# Patient Record
Sex: FEMALE | Race: OTHER | Employment: PART TIME | URBAN - METROPOLITAN AREA
[De-identification: names, ages, dates, MRNs, and addresses within clinical notes are randomized per-mention and may not be internally consistent; named-entity substitution may affect disease eponyms.]

---

## 2024-05-16 NOTE — PATIENT INSTRUCTIONS

## 2024-05-16 NOTE — PROGRESS NOTES
CARDIOLOGY OFFICE NOTE    Joe Putnam MD, [facility]    [address], [place] [address]  Phone [phone]; Fax [phone]  Mobile [phone]   Voice Mail [phone]    Primary care: Wale Walsh MD       ATTENTION:   This medical record was transcribed using an electronic medical records/speech recognition system.  Although proofread, it may and can contain electronic, spelling and other errors.  Corrections may be executed at a later time.  Please feel free to contact us for any clarifications as needed.             Yesi Gomez is a 48 y.o. female with  referred for chest discomfort           HISTORY OF PRESENTING ILLNESS    Ms./. Yesi Gomez  48 y.o. is very pleasant lady who has a history of T2DM, hypertension, dyslipidemia and obesity.  She describes chest discomfort that will occur mostly at rest.  She feels a sensation on her left chest when she is lying down.  Sometimes will get a tingling down her left arm and duration can be up to 5 minutes at a time.  Her last A1c was 7.2 and LDL was elevated 141.  She is working on weight loss and weight is gone from 190-182.  She says she does walk 1 hour a day.         ACTIVE PROBLEM LIST     There are no problems to display for this patient.          PAST MEDICAL HISTORY     History reviewed. No pertinent past medical history.        PAST SURGICAL HISTORY     History reviewed. No pertinent surgical history.       ALLERGIES     No Known Allergies       FAMILY HISTORY     History reviewed. No pertinent family history. negative for cardiac disease       SOCIAL HISTORY     Social History     Socioeconomic History    Marital status:      Spouse name: None    Number of children: None    Years of education: None    Highest education level: None   Tobacco Use    Smoking status: Unknown         MEDICATIONS     Current Outpatient Medications   Medication Sig    atorvastatin (LIPITOR) 40 MG tablet Take 1

## 2024-05-17 ENCOUNTER — OFFICE VISIT (OUTPATIENT)
Age: 49
End: 2024-05-17
Payer: COMMERCIAL

## 2024-05-17 VITALS
HEART RATE: 91 BPM | DIASTOLIC BLOOD PRESSURE: 90 MMHG | OXYGEN SATURATION: 97 % | WEIGHT: 182.5 LBS | SYSTOLIC BLOOD PRESSURE: 142 MMHG | HEIGHT: 60 IN | BODY MASS INDEX: 35.83 KG/M2

## 2024-05-17 DIAGNOSIS — I10 ESSENTIAL HYPERTENSION: Primary | ICD-10-CM

## 2024-05-17 DIAGNOSIS — R07.9 CHEST PAIN, UNSPECIFIED TYPE: Primary | ICD-10-CM

## 2024-05-17 DIAGNOSIS — E78.00 HYPERCHOLESTEREMIA: Primary | ICD-10-CM

## 2024-05-17 DIAGNOSIS — R07.9 CHEST PAIN: ICD-10-CM

## 2024-05-17 PROCEDURE — 99204 OFFICE O/P NEW MOD 45 MIN: CPT | Performed by: SPECIALIST

## 2024-05-17 PROCEDURE — 3080F DIAST BP >= 90 MM HG: CPT | Performed by: SPECIALIST

## 2024-05-17 PROCEDURE — 3077F SYST BP >= 140 MM HG: CPT | Performed by: SPECIALIST

## 2024-05-17 PROCEDURE — 93000 ELECTROCARDIOGRAM COMPLETE: CPT | Performed by: SPECIALIST

## 2024-05-17 RX ORDER — ROSUVASTATIN CALCIUM 20 MG/1
20 TABLET, COATED ORAL DAILY
COMMUNITY
End: 2024-05-17 | Stop reason: SDUPTHER

## 2024-05-17 RX ORDER — LOSARTAN POTASSIUM AND HYDROCHLOROTHIAZIDE 25; 100 MG/1; MG/1
1 TABLET ORAL DAILY
COMMUNITY
Start: 2024-04-22

## 2024-05-17 RX ORDER — ATORVASTATIN CALCIUM 40 MG/1
40 TABLET, FILM COATED ORAL DAILY
COMMUNITY
Start: 2024-05-02 | End: 2024-05-17 | Stop reason: ALTCHOICE

## 2024-05-17 RX ORDER — ROSUVASTATIN CALCIUM 20 MG/1
20 TABLET, COATED ORAL DAILY
Qty: 30 TABLET | Refills: 5 | Status: SHIPPED | OUTPATIENT
Start: 2024-05-17

## 2024-05-24 ENCOUNTER — TELEPHONE (OUTPATIENT)
Age: 49
End: 2024-05-24

## 2024-05-24 NOTE — TELEPHONE ENCOUNTER
Joe Putnam MD Thompson, Evelyn, LPN  Caller: Unspecified (Today, 10:26 AM)  I would like 1st-1st to stop it and see if it improves or not to start the Lipitor for 2 weeks.  If her symptoms do not improve then go back to Crestor

## 2024-05-24 NOTE — TELEPHONE ENCOUNTER
Pt c/o Crestor causing diarrhea/stomach pain since starting it 5/17/24. Want her to go back to Lipitor? Stop and see if symptoms improve?

## 2024-05-24 NOTE — TELEPHONE ENCOUNTER
Patient called in regards to some symptoms that she has been experiencing on her recently changed medication. Patient is experiencing severe diarrhea, upset stomach and loose bowels up to 4 times a day. Patient would like to know if she can take this medication at night instead of in the morning, so that this doesn't continue to affect her during her work in the daytime. Patient would like a call back to discuss.      Patient Phone # [phone]

## 2024-07-24 LAB
ALBUMIN SERPL-MCNC: 4.2 G/DL (ref 3.9–4.9)
ALP SERPL-CCNC: 91 IU/L (ref 44–121)
ALT SERPL-CCNC: 14 IU/L (ref 0–32)
AST SERPL-CCNC: 16 IU/L (ref 0–40)
BILIRUB DIRECT SERPL-MCNC: <0.1 MG/DL (ref 0–0.4)
BILIRUB SERPL-MCNC: 0.3 MG/DL (ref 0–1.2)
CHOLEST SERPL-MCNC: 164 MG/DL (ref 100–199)
HDLC SERPL-MCNC: 55 MG/DL
LDLC SERPL CALC-MCNC: 86 MG/DL (ref 0–99)
PROT SERPL-MCNC: 6.8 G/DL (ref 6–8.5)
TRIGL SERPL-MCNC: 128 MG/DL (ref 0–149)
VLDLC SERPL CALC-MCNC: 23 MG/DL (ref 5–40)

## 2024-07-24 NOTE — RESULT ENCOUNTER NOTE
Dear Ms. Jason,  Good News!  Your test results are stable.   Please continue the current treatment plan.  We can discuss more at your scheduled follow up if you have questions.  Best Regards,  GISSELLE Mirza NP

## 2024-08-21 RX ORDER — ROSUVASTATIN CALCIUM 20 MG/1
20 TABLET, COATED ORAL DAILY
Qty: 90 TABLET | Refills: 0 | Status: SHIPPED | OUTPATIENT
Start: 2024-08-21

## 2024-08-23 ENCOUNTER — OFFICE VISIT (OUTPATIENT)
Age: 49
End: 2024-08-23
Payer: COMMERCIAL

## 2024-08-23 VITALS
OXYGEN SATURATION: 99 % | HEIGHT: 60 IN | BODY MASS INDEX: 35.85 KG/M2 | WEIGHT: 182.6 LBS | SYSTOLIC BLOOD PRESSURE: 130 MMHG | HEART RATE: 88 BPM | DIASTOLIC BLOOD PRESSURE: 78 MMHG

## 2024-08-23 DIAGNOSIS — I10 ESSENTIAL HYPERTENSION: Primary | ICD-10-CM

## 2024-08-23 DIAGNOSIS — E78.5 HYPERLIPIDEMIA, UNSPECIFIED HYPERLIPIDEMIA TYPE: ICD-10-CM

## 2024-08-23 PROCEDURE — 99214 OFFICE O/P EST MOD 30 MIN: CPT

## 2024-08-23 PROCEDURE — 3075F SYST BP GE 130 - 139MM HG: CPT

## 2024-08-23 PROCEDURE — 3078F DIAST BP <80 MM HG: CPT

## 2024-08-23 RX ORDER — ERGOCALCIFEROL 1.25 MG/1
50000 CAPSULE ORAL
COMMUNITY

## 2024-08-23 NOTE — PATIENT INSTRUCTIONS
Parameters:  Systolic (top number) 100-160  Diastolic (bottom number) 50-99  Heart rate       How to get your blood pressure checked:   Before  During  After    In the 30 minutes before your BP is taken:   NO Smoking   NO Caffeine   NO Exercise  Make sure the cuff is the right size and in the right place.  Wait 5 minutes and retake your BP if needed. In case of machine error.    Keep your cuffed arm on a flat surface, like a table, and at heart level. Cuff should be at heart level not your arm Keep a log and bring it to every check up.    Sit STILL for 5 minutes prior to taking your BP.  Sit upright, back straight, feet flat on the floor.        Do not check your blood pressure more then once at a time, repeated attempts will only increase the numbers. Only take your B/P twice a day at least an hour after taking your medication. Preferably once in the morning and once in the evening. If you get 3 or more consecutive readings outside of these parameters or have symptoms please let us know so that we can adjust your medication.      If you recently have started, stopped, or changed a medication dosage please give your body at least a week or two to get use to the change.    Avoid high-sodium foods  Avoid eating:  Smoked, cured, salted, and canned meat, fish, and poultry.  Ham, andersen, hot dogs, and luncheon meats.  Regular, hard, and processed cheese and regular peanut butter.  Crackers with salted tops, and other salted snack foods such as pretzels, chips, and salted popcorn.  Frozen prepared meals, unless labeled low-sodium.  Canned and dried soups, broths, and bouillon, unless labeled sodium-free or low-sodium.  Canned vegetables, unless labeled sodium-free or low-sodium.  French fries, pizza, tacos, and other fast foods.  Pickles, olives, ketchup, and other condiments, especially soy sauce, unless labeled sodium-free or low-sodium.

## 2024-08-23 NOTE — PROGRESS NOTES
CARDIOLOGY OFFICE NOTE    Primary care: Wale Walsh MD     Yesi Gomez is a 49 y.o. female with  referred for chest discomfort           HISTORY OF PRESENTING ILLNESS   Yesi Gomez  49 y.o. female presents today for follow-up. She is feeling well. Denies any cardiac complaints. Reviewed results from recent testing with patient.        ACTIVE PROBLEM LIST     There are no problems to display for this patient.          PAST MEDICAL HISTORY     No past medical history on file.        PAST SURGICAL HISTORY     No past surgical history on file.       ALLERGIES     No Known Allergies       FAMILY HISTORY     No family history on file. negative for cardiac disease       SOCIAL HISTORY     Social History     Socioeconomic History    Marital status:      Spouse name: None    Number of children: None    Years of education: None    Highest education level: None   Tobacco Use    Smoking status: Unknown         MEDICATIONS     Current Outpatient Medications   Medication Sig    vitamin D (ERGOCALCIFEROL) 1.25 MG (94165 UT) CAPS capsule Take 1 capsule by mouth Twice a Week    rosuvastatin (CRESTOR) 20 MG tablet TAKE 1 TABLET BY MOUTH EVERY DAY    losartan-hydroCHLOROthiazide (HYZAAR) 100-25 MG per tablet Take 1 tablet by mouth daily    metFORMIN (GLUCOPHAGE) 1000 MG tablet Take 1 tablet by mouth 2 times daily     No current facility-administered medications for this visit.       I have reviewed the nurses notes, vitals, problem list, allergy list, medical history, family, social history and medications.       REVIEW OF SYMPTOMS    As per HPI  General: Pt denies excessive weight gain or loss. Pt is able to conduct ADL's  HEENT: Denies blurred vision, headaches, hearing loss, epistaxis and difficulty swallowing.  Respiratory: Denies cough, congestion, shortness of breath, VELASCO, wheezing or stridor.  Cardiovascular: Denies precordial pain, palpitations, edema or PND  Gastrointestinal:

## 2025-01-31 RX ORDER — ROSUVASTATIN CALCIUM 20 MG/1
20 TABLET, COATED ORAL DAILY
Qty: 90 TABLET | Refills: 3 | Status: SHIPPED | OUTPATIENT
Start: 2025-01-31

## 2025-01-31 NOTE — TELEPHONE ENCOUNTER
Refill per VO of Dr. Woodard  Last appt: 8/23/2024    Future Appointments   Date Time Provider Department Center   8/26/2025  8:20 AM Julisa Woodard MD [facility]       Requested Prescriptions     Signed Prescriptions Disp Refills    rosuvastatin (CRESTOR) 20 MG tablet 90 tablet 3     Sig: TAKE 1 TABLET BY MOUTH EVERY DAY     Authorizing Provider: JULISA WOODARD     Ordering User: NIKITA HERNANDEZ